# Patient Record
Sex: MALE | Race: OTHER
[De-identification: names, ages, dates, MRNs, and addresses within clinical notes are randomized per-mention and may not be internally consistent; named-entity substitution may affect disease eponyms.]

---

## 2018-07-03 ENCOUNTER — HOSPITAL ENCOUNTER (EMERGENCY)
Dept: HOSPITAL 25 - ED | Age: 63
Discharge: HOME | End: 2018-07-03
Payer: COMMERCIAL

## 2018-07-03 VITALS — SYSTOLIC BLOOD PRESSURE: 129 MMHG | DIASTOLIC BLOOD PRESSURE: 82 MMHG

## 2018-07-03 DIAGNOSIS — R50.9: ICD-10-CM

## 2018-07-03 DIAGNOSIS — J11.1: Primary | ICD-10-CM

## 2018-07-03 DIAGNOSIS — R10.9: ICD-10-CM

## 2018-07-03 DIAGNOSIS — J02.9: ICD-10-CM

## 2018-07-03 PROCEDURE — 99282 EMERGENCY DEPT VISIT SF MDM: CPT

## 2018-07-03 PROCEDURE — 87651 STREP A DNA AMP PROBE: CPT

## 2018-07-03 NOTE — ED
HPI Febrile Illness





- HPI Summary


HPI Summary: 





Complains of subjective fever, body aches, bilateral conjunctival injection and 

ocular burning, sore throat, dry cough, diffuse abdominal pain, leg pain 

starting last night.  Family is here from [place] for wedding, almost all other 

family members have been diagnosed positive for flu a.  Denies trauma, headache

, ear pain, neck pain, N/V/D, CP, SOB, change in urine or BM.  Medical history 

is DM, HTN.  Denies contact lenses.  Horse glasses.





- History of Current Complaint


Chief Complaint: EDFluSymptoms


Time Seen by Provider: 07/03/18 18:03


Hx Obtained From: Patient


Onset/Duration: Started Hours Ago


Timing: Constant


Initial Severity: Mild


Current Severity: Mild


Pain Intensity: 0


Pain Scale Used: 0-10 Numeric


Aggravating Factors: Nothing


Alleviating Factors: Nothing


Associated Signs and Symptoms: Myalgia, Sore Throat





- Allergy/Home Medications


Allergies/Adverse Reactions: 


 Allergies











Allergy/AdvReac Type Severity Reaction Status Date / Time


 


No Known Allergies Allergy   Verified 07/03/18 19:09














PMH/Surg Hx/FS Hx/Imm Hx


Previously Healthy: Yes


Endocrine/Hematology History: Reports: Hx Diabetes


   Denies: Hx Anticoagulant Therapy


 History: 


   Denies: Hx Dialysis


Neurological History: 


   Denies: Hx CVA


Infectious Disease History: No


Infectious Disease History: 


   Denies: Traveled Outside the [place] in Last 30 Days





- Family History


Known Family History: Positive: Hypertension





- Social History


Alcohol Use: None


Substance Use Type: Reports: None


Smoking Status (MU): Never Smoked Tobacco





Review of Systems


Positive: Fever


Positive: Drainage


Positive: Sore Throat


Cardiovascular: Negative


Respiratory: Negative


Positive: Abdominal Pain


Genitourinary: Negative


Positive: Myalgia


Skin: Negative


Neurological: Negative


Psychological: Normal


All Other Systems Reviewed And Are Negative: Yes





Physical Exam


Triage Information Reviewed: Yes


Vital Signs On Initial Exam: 


 Initial Vitals











Temp Pulse Resp BP Pulse Ox


 


 98.9 F   83   19   142/83   97 


 


 07/03/18 17:43  07/03/18 17:43  07/03/18 17:43  07/03/18 17:43  07/03/18 17:43











Vital Signs Reviewed: Yes


Appearance: Positive: Well-Appearing


Skin: Positive: Warm


Head/Face: Positive: Normal Head/Face Inspection


Eyes: Positive: Conjunctiva Inflammed, Discharge


ENT: Positive: Pharyngeal erythema, TMs normal, Uvula midline.  Negative: 

Tonsillar swelling, Tonsillar exudate, Trismus, Muffled voice, Hoarse voice


Neck: Positive: Supple


Respiratory/Lung Sounds: Positive: Clear to Auscultation


Cardiovascular: Positive: Normal


Abdomen Description: Positive: Nontender


Musculoskeletal: Positive: Normal


Neurological: Positive: Normal


Psychiatric: Positive: Normal


AVPU Assessment: Alert





- Himanshu Coma Scale


Best Eye Response: 4 - Spontaneous


Best Motor Response: 6 - Obeys Commands


Best Verbal Response: 5 - Oriented


Coma Scale Total: 15





Diagnostics





- Vital Signs


 Vital Signs











  Temp Pulse Resp BP Pulse Ox


 


 07/03/18 17:43  98.9 F  83  19  142/83  97














- Laboratory


Lab Results: 


 Lab Results











  07/03/18 07/03/18 Range/Units





  18:17 18:52 


 


Influenza A (Rapid)  Positive A   (Negative)  


 


Influenza B (Rapid)  Negative   (Negative)  


 


Group A Strep Rapid   Negative  (Negative)  











Lab Statement: Any lab studies that have been ordered have been reviewed, and 

results considered in the medical decision making process.





Course/Dx





- Course


Course Of Treatment: Complains of subjective fever, body aches, bilateral 

conjunctival injection and ocular burning with bilateral thick discharge, sore 

throat, dry cough, diffuse abdominal pain, leg pain starting last night.  

Family is here from [place] for wedding, almost all other family members have 

been diagnosed positive for flu a.  Denies headache, ear pain, neck pain, N/V/D

, CP, SOB, change in urine or BM.  Medical history is DM, HTN.  Vital signs 

within normal limits.  Strep negative.  Positive for flu a.  Rx for Tamiflu.  

Rx for lidocaine viscus for sore throat.  Rx for gentamicin eyedrops for 

conjunctival injection with discharge.





- Diagnoses


Provider Diagnoses: 


 Flu








Discharge





- Sign-Out/Discharge


Documenting (check all that apply): Discharge/Admit/Transfer





- Discharge Plan


Condition: Stable


Disposition: HOME


Prescriptions: 


Lidocaine 2% VISCOUS* [Xylocaine 2% Viscous*] 15 ml SWISH SPIT Q6H PRN #1 btl


 PRN Reason: Pain


Oseltamivir CAP* [Tamiflu CAP*] 75 mg PO BID 5 Days #10 cap


Patient Education Materials:  Influenza (ED)


Referrals: 


No Primary Care Phys,NOPCP [Primary Care Provider] - 


Additional Instructions: 


Take Tamiflu as directed.  Follow-up with primary care.  Return to the ED for 

any new or worsening symptoms





- Billing Disposition and Condition


Condition: STABLE


Disposition: Home